# Patient Record
Sex: FEMALE | Race: WHITE | Employment: OTHER | URBAN - METROPOLITAN AREA
[De-identification: names, ages, dates, MRNs, and addresses within clinical notes are randomized per-mention and may not be internally consistent; named-entity substitution may affect disease eponyms.]

---

## 2019-04-15 ENCOUNTER — APPOINTMENT (OUTPATIENT)
Dept: ULTRASOUND IMAGING | Facility: CLINIC | Age: 79
End: 2019-04-15
Payer: MEDICARE

## 2019-04-15 ENCOUNTER — APPOINTMENT (OUTPATIENT)
Dept: GENERAL RADIOLOGY | Facility: CLINIC | Age: 79
End: 2019-04-15
Payer: MEDICARE

## 2019-04-15 ENCOUNTER — HOSPITAL ENCOUNTER (EMERGENCY)
Facility: CLINIC | Age: 79
Discharge: HOME | End: 2019-04-15
Attending: SPECIALIST
Payer: MEDICARE

## 2019-04-15 VITALS
DIASTOLIC BLOOD PRESSURE: 89 MMHG | HEART RATE: 71 BPM | OXYGEN SATURATION: 95 % | WEIGHT: 223 LBS | TEMPERATURE: 97.7 F | SYSTOLIC BLOOD PRESSURE: 157 MMHG | RESPIRATION RATE: 16 BRPM | BODY MASS INDEX: 41.04 KG/M2 | HEIGHT: 62 IN

## 2019-04-15 DIAGNOSIS — M79.604 RIGHT LEG PAIN: Primary | ICD-10-CM

## 2019-04-15 PROCEDURE — 6360000002 HC RX W HCPCS: Performed by: SPECIALIST

## 2019-04-15 PROCEDURE — 99284 EMERGENCY DEPT VISIT MOD MDM: CPT

## 2019-04-15 PROCEDURE — 73590 X-RAY EXAM OF LOWER LEG: CPT

## 2019-04-15 PROCEDURE — 73610 X-RAY EXAM OF ANKLE: CPT

## 2019-04-15 PROCEDURE — 93971 EXTREMITY STUDY: CPT

## 2019-04-15 PROCEDURE — 73562 X-RAY EXAM OF KNEE 3: CPT

## 2019-04-15 PROCEDURE — 96372 THER/PROPH/DIAG INJ SC/IM: CPT

## 2019-04-15 RX ORDER — POLYETHYLENE GLYCOL 3350 17 G/17G
17 POWDER, FOR SOLUTION ORAL DAILY PRN
COMMUNITY

## 2019-04-15 RX ORDER — NALBUPHINE HCL 10 MG/ML
AMPUL (ML) INJECTION
Status: DISPENSED
Start: 2019-04-15 | End: 2019-04-16

## 2019-04-15 RX ORDER — DOCUSATE SODIUM 100 MG/1
100 CAPSULE, LIQUID FILLED ORAL DAILY
COMMUNITY

## 2019-04-15 RX ORDER — M-VIT,TX,IRON,MINS/CALC/FOLIC 27MG-0.4MG
1 TABLET ORAL DAILY
COMMUNITY

## 2019-04-15 RX ORDER — GABAPENTIN 300 MG/1
300 CAPSULE ORAL 2 TIMES DAILY
COMMUNITY

## 2019-04-15 RX ORDER — OMEPRAZOLE 20 MG/1
20 CAPSULE, DELAYED RELEASE ORAL 2 TIMES DAILY
COMMUNITY

## 2019-04-15 RX ORDER — NALBUPHINE HCL 10 MG/ML
10 AMPUL (ML) INJECTION ONCE
Status: COMPLETED | OUTPATIENT
Start: 2019-04-15 | End: 2019-04-15

## 2019-04-15 RX ORDER — OXYBUTYNIN CHLORIDE 5 MG/1
10 TABLET ORAL 2 TIMES DAILY
COMMUNITY

## 2019-04-15 RX ADMIN — NALBUPHINE HYDROCHLORIDE 10 MG: 10 INJECTION, SOLUTION INTRAMUSCULAR; INTRAVENOUS; SUBCUTANEOUS at 15:12

## 2019-04-15 ASSESSMENT — PAIN SCALES - GENERAL
PAINLEVEL_OUTOF10: 8
PAINLEVEL_OUTOF10: 6
PAINLEVEL_OUTOF10: 8

## 2019-04-15 ASSESSMENT — PAIN DESCRIPTION - PAIN TYPE
TYPE: ACUTE PAIN;CHRONIC PAIN
TYPE: ACUTE PAIN

## 2019-04-15 ASSESSMENT — PAIN DESCRIPTION - DESCRIPTORS: DESCRIPTORS: SHARP

## 2019-04-15 ASSESSMENT — PAIN DESCRIPTION - LOCATION: LOCATION: KNEE;LEG

## 2019-04-15 ASSESSMENT — PAIN DESCRIPTION - ORIENTATION: ORIENTATION: RIGHT

## 2019-04-15 ASSESSMENT — PAIN DESCRIPTION - FREQUENCY: FREQUENCY: CONTINUOUS

## 2019-04-15 NOTE — ED NOTES
Pt arrives to the ER with her daughter. The patient has a hx of bursitis and has had an increase in her right knee pain. Her daughter states that she ran out of her T3 due to being over her limit. The patient denies injury/ trauma. Pt resting in bed, comfort offered, no concerns no s/s of distress.      Lester Recinos RN  04/15/19 3102

## 2019-04-15 NOTE — ED NOTES
Pt assisted to bedside commode.       Bernarda Rogers, RN  04/15/19 1317 [facility] Cody Jakcson RN  04/15/19 5171

## 2019-04-15 NOTE — ED NOTES
Pt assisted to bedside commode.     Maycol Chamberlain, ROSA  04/15/19 877 [facility], RN  04/15/19 8124

## 2019-04-15 NOTE — ED PROVIDER NOTES
[facility]  [address] [address]  Phone: [phone]      Pt Name: Roxanne Clark  MRN: [id number]  [place] 1940  Date of evaluation: 4/15/2019      CHIEF COMPLAINT       Chief Complaint   Patient presents with    Leg Pain     Right          HISTORY OF PRESENT ILLNESS    Roxanne Clark is a [de-identified] y.o. female who presents   Chief Complaint   Patient presents with    Leg Pain     Right    . 40-year-old female patient presents to the emergency department brought in by her daughter after patient has been having the right knee pain, right lower leg pain and right ankle pain for last 3 days. Patient describes pain as a dull aching in nature 6 out of 10 in intensity worse with the movements and better with rest.  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 She also has had greater trochanteric bursitis in the right side and spondylolisthesis in the lower back. She takes Tylenol with codeine one tablet every 6 hours for the chronic back and hip pain. The daughter states that patient ran out of her Tylenol with codeine due to being or limit. She has appointment with the pain specialist for local injections for bursitis in 3 days. Patient denies any swelling in the lower extremities and daughter has not noticed any redness in the right lower leg. No history of fever or chills. No history of DVT or PE in the past and patient denies any recent long travels or prolonged immobilization. She denies any chest pain, shortness of breath, palpitations or diaphoresis. REVIEW OF SYSTEMS       Review of Systems   Constitutional: Negative for chills and fever. Musculoskeletal: Positive for arthralgias, gait problem and myalgias. Negative for neck pain. Neurological: Negative for weakness and numbness. All other systems reviewed and are negative.        PAST MEDICAL HISTORY    has a past medical history of saturation is 95%. Physical Exam   Constitutional: She is oriented to person, place, and time. She appears well-developed and well-nourished. HENT:   Head: Normocephalic and atraumatic. Nose: Nose normal.   Mouth/Throat: Oropharynx is clear and moist.   Eyes: Pupils are equal, round, and reactive to light. EOM are normal.   Neck: Normal range of motion. Neck supple. Cardiovascular: Normal rate, regular rhythm, normal heart sounds and intact distal pulses. No murmur heard. Pulmonary/Chest: Effort normal and breath sounds normal. No respiratory distress. Abdominal: Soft. Bowel sounds are normal. She exhibits no distension. There is no tenderness. Musculoskeletal:   Patient has the tenderness on the anterior aspect of the right knee as well as tenderness in the right calf and medial and lateral malleolar area of the right ankle. There is no obvious deformity and there is no erythema. Homans sign is negative. Is occluded, motor and sensory examination is intact distally. Neurological: She is alert and oriented to person, place, and time. Skin: Skin is warm and dry. Nursing note and vitals reviewed. DIFFERENTIAL DIAGNOSIS/ MDM:     Chronic leg pain, deep venous thrombosis, osteoarthritis, cellulitis although doubtful    DIAGNOSTIC RESULTS     EKG: All EKG's are interpreted by the Emergency Department Physician who either signs or Co-signs this chart in the absence of a cardiologist.    None obtained    RADIOLOGY:   I directly visualized the following  images and reviewed the radiologist interpretations:  US DUP LOWER EXTREMITY RIGHT FERNANDO   Final Result   No evidence of DVT in the right lower extremity. XR TIBIA FIBULA RIGHT (2 VIEWS)   Final Result   Right knee:      1. Prior right knee arthroplasty and patellar resurfacing. 2. No significant periprosthetic lucency or acute osseous abnormality. Right tibia/fibula:      1. Right knee arthroplasty and patellar resurfacing.    2. Mild plantar calcaneal spur. 3. No acute osseous abnormality. Right ankle:      1. Moderate soft tissue swelling about the ankle. 2. Mild plantar calcaneal spur. Degenerative changes as detailed above. 3. No acute fracture or gross dislocation. XR KNEE RIGHT (3 VIEWS)   Final Result   Right knee:      1. Prior right knee arthroplasty and patellar resurfacing. 2. No significant periprosthetic lucency or acute osseous abnormality. Right tibia/fibula:      1. Right knee arthroplasty and patellar resurfacing. 2. Mild plantar calcaneal spur. 3. No acute osseous abnormality. Right ankle:      1. Moderate soft tissue swelling about the ankle. 2. Mild plantar calcaneal spur. Degenerative changes as detailed above. 3. No acute fracture or gross dislocation. XR ANKLE RIGHT (MIN 3 VIEWS)   Final Result   Right knee:      1. Prior right knee arthroplasty and patellar resurfacing. 2. No significant periprosthetic lucency or acute osseous abnormality. Right tibia/fibula:      1. Right knee arthroplasty and patellar resurfacing. 2. Mild plantar calcaneal spur. 3. No acute osseous abnormality. Right ankle:      1. Moderate soft tissue swelling about the ankle. 2. Mild plantar calcaneal spur. Degenerative changes as detailed above. 3. No acute fracture or gross dislocation. Xr Knee Right (3 Views)    Result Date: 4/15/2019  EXAMINATION: 3 XRAY VIEWS OF THE RIGHT KNEE; 2 XRAY VIEWS OF THE RIGHT TIBIA AND FIBULA; 3 XRAY VIEWS OF THE RIGHT ANKLE 4/15/2019 1:54 pm COMPARISON: None. HISTORY: ORDERING SYSTEM PROVIDED HISTORY: Pain TECHNOLOGIST PROVIDED HISTORY: Pain Ordering Physician Provided Reason for Exam: Right lower leg pain, no known injury. Pt cannot walk on it due to pain Acuity: Acute Type of Exam: Initial 68-year-old female with acute right lower leg pain; no known injury FINDINGS: Right knee: Prior right knee arthroplasty and patellar resurfacing.   No significant periprosthetic lucency or acute osseous abnormality. Distal femoral component demonstrates proper alignment with the tibial tray. Atherosclerotic calcification of the vasculature. Hardware appears intact. No sizable joint effusion. Right tibia/fibula: Right knee arthroplasty hardware is present. No significant periprosthetic lucency or acute osseous abnormality. Tibia and fibula appear intact. Ankle mortise appears intact. Mild soft tissue swelling about the ankle. Osseous alignment is normal.  No tibiotalar joint effusion. Mild plantar calcaneal spur. Right ankle: Moderate soft tissue swelling about the ankle. No sizable tibiotalar joint effusion. Mild plantar calcaneal spur. Ankle mortise appears intact. Osseous alignment is normal.  No acute fracture or gross dislocation. Boehler's angle is maintained. Mild degenerative changes at the midfoot. Right knee: 1. Prior right knee arthroplasty and patellar resurfacing. 2. No significant periprosthetic lucency or acute osseous abnormality. Right tibia/fibula: 1. Right knee arthroplasty and patellar resurfacing. 2. Mild plantar calcaneal spur. 3. No acute osseous abnormality. Right ankle: 1. Moderate soft tissue swelling about the ankle. 2. Mild plantar calcaneal spur. Degenerative changes as detailed above. 3. No acute fracture or gross dislocation. Xr Tibia Fibula Right (2 Views)    Result Date: 4/15/2019  EXAMINATION: 3 XRAY VIEWS OF THE RIGHT KNEE; 2 XRAY VIEWS OF THE RIGHT TIBIA AND FIBULA; 3 XRAY VIEWS OF THE RIGHT ANKLE 4/15/2019 1:54 pm COMPARISON: None. HISTORY: ORDERING SYSTEM PROVIDED HISTORY: Pain TECHNOLOGIST PROVIDED HISTORY: Pain Ordering Physician Provided Reason for Exam: Right lower leg pain, no known injury. Pt cannot walk on it due to pain Acuity: Acute Type of Exam: Initial 50-year-old female with acute right lower leg pain; no known injury FINDINGS: Right knee: Prior right knee arthroplasty and patellar resurfacing. No significant periprosthetic lucency or acute osseous abnormality. Distal femoral component demonstrates proper alignment with the tibial tray. Atherosclerotic calcification of the vasculature. Hardware appears intact. No sizable joint effusion. Right tibia/fibula: Right knee arthroplasty hardware is present. No significant periprosthetic lucency or acute osseous abnormality. Tibia and fibula appear intact. Ankle mortise appears intact. Mild soft tissue swelling about the ankle. Osseous alignment is normal.  No tibiotalar joint effusion. Mild plantar calcaneal spur. Right ankle: Moderate soft tissue swelling about the ankle. No sizable tibiotalar joint effusion. Mild plantar calcaneal spur. Ankle mortise appears intact. Osseous alignment is normal.  No acute fracture or gross dislocation. Boehler's angle is maintained. Mild degenerative changes at the midfoot. Right knee: 1. Prior right knee arthroplasty and patellar resurfacing. 2. No significant periprosthetic lucency or acute osseous abnormality. Right tibia/fibula: 1. Right knee arthroplasty and patellar resurfacing. 2. Mild plantar calcaneal spur. 3. No acute osseous abnormality. Right ankle: 1. Moderate soft tissue swelling about the ankle. 2. Mild plantar calcaneal spur. Degenerative changes as detailed above. 3. No acute fracture or gross dislocation. Xr Ankle Right (min 3 Views)    Result Date: 4/15/2019  EXAMINATION: 3 XRAY VIEWS OF THE RIGHT KNEE; 2 XRAY VIEWS OF THE RIGHT TIBIA AND FIBULA; 3 XRAY VIEWS OF THE RIGHT ANKLE 4/15/2019 1:54 pm COMPARISON: None. HISTORY: ORDERING SYSTEM PROVIDED HISTORY: Pain TECHNOLOGIST PROVIDED HISTORY: Pain Ordering Physician Provided Reason for Exam: Right lower leg pain, no known injury. Pt cannot walk on it due to pain Acuity: Acute Type of Exam: Initial 62-year-old female with acute right lower leg pain; no known injury FINDINGS: Right knee: Prior right knee arthroplasty and patellar resurfacing. No significant periprosthetic lucency or acute osseous abnormality. Distal femoral component demonstrates proper alignment with the tibial tray. Atherosclerotic calcification of the vasculature. Hardware appears intact. No sizable joint effusion. Right tibia/fibula: Right knee arthroplasty hardware is present. No significant periprosthetic lucency or acute osseous abnormality. Tibia and fibula appear intact. Ankle mortise appears intact. Mild soft tissue swelling about the ankle. Osseous alignment is normal.  No tibiotalar joint effusion. Mild plantar calcaneal spur. Right ankle: Moderate soft tissue swelling about the ankle. No sizable tibiotalar joint effusion. Mild plantar calcaneal spur. Ankle mortise appears intact. Osseous alignment is normal.  No acute fracture or gross dislocation. Boehler's angle is maintained. Mild degenerative changes at the midfoot. Right knee: 1. Prior right knee arthroplasty and patellar resurfacing. 2. No significant periprosthetic lucency or acute osseous abnormality. Right tibia/fibula: 1. Right knee arthroplasty and patellar resurfacing. 2. Mild plantar calcaneal spur. 3. No acute osseous abnormality. Right ankle: 1. Moderate soft tissue swelling about the ankle. 2. Mild plantar calcaneal spur. Degenerative changes as detailed above. 3. No acute fracture or gross dislocation. Us Dup Lower Extremity Right George    Result Date: 4/15/2019  EXAMINATION: DUPLEX VENOUS ULTRASOUND OF THE RIGHT LOWER EXTREMITY, 4/15/2019 2:17 pm TECHNIQUE: Duplex ultrasound and Doppler images were obtained of the right lower extremity. COMPARISON: None. HISTORY: ORDERING SYSTEM PROVIDED HISTORY: Rule out DVT TECHNOLOGIST PROVIDED HISTORY: Ordering Physician Provided Reason for Exam: rt leg pain Acuity: Acute Type of Exam: Initial FINDINGS: The visualized veins of the right lower extremity are patent and free of echogenic thrombus.  The veins are normally compressible and have normal phasic flow. No evidence of DVT in the right lower extremity. LABS:  Labs Reviewed - No data to display      EMERGENCY DEPARTMENT COURSE:   Vitals:    Vitals:    04/15/19 1318 04/15/19 1535   BP: (!) 159/77 (!) 157/89   Pulse: 66 71   Resp: 16 16   Temp:  97.7 °F (36.5 °C)   TempSrc: Oral Temporal   SpO2: 94% 95%   Weight: 101.2 kg (223 lb)    Height: 5' 2\" (1.575 m)      -------------------------  BP: (!) 157/89, Temp: 97.7 °F (36.5 °C), Pulse: 71, Resp: 16    Orders Placed This Encounter   Medications    nalbuphine (NUBAIN) injection 10 mg       During emergency department course, patient was given Nubain 10 mg intramuscularly. Plan is to discharge the patient with instructions drink plenty of oral fluids, continue current medications, follow up with PCP, call the office tomorrow morning, return if worse. I have reviewed the disposition diagnosis with the patient and or their family/guardian. I have answered their questions and given discharge instructions. They voiced understanding of these instructions and did not have any further questions or complaints. Re-evaluation Notes    Patient is resting comfortably and does not appear to be in any significant pain or distress. PROCEDURES:  None    FINAL IMPRESSION      1.  Right leg pain          DISPOSITION/PLAN   DISPOSITION Decision To Discharge 04/15/2019 03:03:16 PM      Condition on Disposition    Stable    PATIENT REFERRED TO:  Megan Herman MD  [address] [place] [address]  [phone]    Call today  For reevaluation of current symptoms    [facility]  / [address]   [phone]    If symptoms worsen      DISCHARGE MEDICATIONS:  Discharge Medication List as of 4/15/2019  3:03 PM          (Please note that portions of this note were completed with a voice recognition program.  Efforts were made to edit the dictations but occasionally words are mis-transcribed.)    Romo MD, F.A.C.E.P.   Attending Emergency Physician     Marjorie Mazno MD  04/15/19 8712

## 2019-04-15 NOTE — ED NOTES
Updated at bedside on plan of care, per RN.   No concerns, no s/s of distress       Pushpa Wilkes RN  04/15/19 9453